# Patient Record
(demographics unavailable — no encounter records)

---

## 2024-11-12 NOTE — PHYSICAL EXAM
[Chaperone Present] : A chaperone was present in the examining room during all aspects of the physical examination [Normal] : Vagina: Normal [Fully active, able to carry on all pre-disease performance without restriction] : Status 0 - Fully active, able to carry on all pre-disease performance without restriction

## 2024-11-13 NOTE — REVIEW OF SYSTEMS
[Diarrhea: Grade 0] : Diarrhea: Grade 0 [FreeTextEntry2] : + elevated BP and headaches associated with it.  [Negative] : ENT [FreeTextEntry8] : occasional vag spotting

## 2024-11-13 NOTE — HISTORY OF PRESENT ILLNESS
[Disease: _____________________] : Disease: [unfilled] [AJCC Stage: ____] : AJCC Stage: [unfilled] [de-identified] : 68 y.o female with newly diagnosed fallopian tube cancer s/p surgery on 11/28/2022 Patient initially presented with pain in the vaginal area She saw gyn, Dr. Denson  and an USG was obtained which showed adnexal mass  She was referred to Dr. White. Ms. Vernon is a 68 years old  referred by Dr. Saldana for bilateral adnexal masses.  CT A/P 11/1/22: Bilateral adnexal masses, solid and irregular appearance, 9.5 x 8.2 cm on left and 6.7 x 5.4 on right. No ascites or LAD identified. 1.7 cm omental nodule. Multiple small liver cysts. Splenomegaly.  10/20/22 TVUS (Office US) Uterus: 9.59 x 7.06 x 4.41 cm (mult fibroids, largest posterior measures 4 x 3 cm) EM: 6.35 mm Left Adnexal mass: 7.5 x 6.8 x 6.2 cm  Right adnexal mass: 5.4 x 4.4 x 3.2 cm with increased vascularity Free fluid visualized in the right adnexa and culdesac  Labs (in [facility])  = 29 OVA1 = 6.6 CEA < 0.6 CA 19-9 < 2  She saw Dr. 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 diff colitis , completed a course of Vanco.( last dose was on 12/22/22)  PMH: ET, HTN,  PSH:CESAR, BSO, Csection SH:, three children. No smoking, no drinking. Retired ASA FH:Dad with brain cancer, 70s, Aunt and cousins. ALL: PCN, hives, Latex Medications: ASA, Agrylin, Amlodipine  She completed six cycles of Carboplatin and Taxol. Last dose 4/2023. She declined Avastin maintenance. Not a candidate for PARPi as no somatic or germline mutation. HRp and due to underlying marrow diseae( ET) hgh risk for myelosuppression and secondary AML/ MDS. Routine blood work in Feb, 2024 showed elevated . Scans were normal. On 3/21/24 PET done showed- Multiple FDG avid tissues in the mesentery, retroperitoneal and iliac fernando stations concerning for malignant process. However, in a patient with myelofibrosis, extramedullary hematopoiesis can take place. Left common iliac node with the most intense activity is accessible for percutaneous biopsy. FDG avid left supraclavicular nodes with calcific densities stable since 8/2023 may represent reactive process.  2. Diffuse sclerotic changes throughout the osseous structures compatible with history of myelofibrosis. Biopsy of the supraclavicular LN consistent with relapsed serous ovarian cancer.  She completed 6 cycles of Carboplatin/Doxil/Zirabev [de-identified] : Carboplatin and Taxol (1/12/23- 4/27/23) six cycles. No pathogenic mutation on genetic test. HRDp Carboplatin/Doxil/Zirabev (six cycle) [FreeTextEntry1] : Zirabev maintenance C1 - 11/13/24 [de-identified] : Patient is here for follow up and to start maintenance Zirabev. She is feeling well, has recovered from chemo. She saw Dr. White yesterday for intermittent vag spotting, exam was good. Advised lubricant for vag dryness. BP has been well controlled, forgot to take her HTN meds today. Appetite is good, she is gaining a few pounds. Recent breast biopsy was benign. Denies chest pain, SOB, abdominal pain, nausea, vomiting, bowel/bladder issues, swelling.

## 2024-11-13 NOTE — REASON FOR VISIT
[Follow-Up Visit] : a follow-up [Pre-Treatment Visit] : a pre-treatment [FreeTextEntry2] : relapsed FTCA

## 2024-11-13 NOTE — PHYSICAL EXAM
[Fully active, able to carry on all pre-disease performance without restriction] : Status 0 - Fully active, able to carry on all pre-disease performance without restriction [Mucositis] : mucositis [Normal] : affect appropriate

## 2024-12-08 NOTE — HISTORY OF PRESENT ILLNESS
[Disease: _____________________] : Disease: [unfilled] [AJCC Stage: ____] : AJCC Stage: [unfilled] [de-identified] : 68 y.o female with newly diagnosed fallopian tube cancer s/p surgery on 11/28/2022 Patient initially presented with pain in the vaginal area She saw gyn, Dr. Denson  and an USG was obtained which showed adnexal mass  She was referred to Dr. White. Ms. Vernon is a 68 years old  referred by Dr. Saldana for bilateral adnexal masses.  CT A/P 11/1/22: Bilateral adnexal masses, solid and irregular appearance, 9.5 x 8.2 cm on left and 6.7 x 5.4 on right. No ascites or LAD identified. 1.7 cm omental nodule. Multiple small liver cysts. Splenomegaly.  10/20/22 TVUS (Office US) Uterus: 9.59 x 7.06 x 4.41 cm (mult fibroids, largest posterior measures 4 x 3 cm) EM: 6.35 mm Left Adnexal mass: 7.5 x 6.8 x 6.2 cm  Right adnexal mass: 5.4 x 4.4 x 3.2 cm with increased vascularity Free fluid visualized in the right adnexa and culdesac  Labs (in [facility])  = 29 OVA1 = 6.6 CEA < 0.6 CA 19-9 < 2  She saw Dr. 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 Stage: III C, FTCA      Patient had C. diff colitis , completed a course of Vanco.( last dose was on 12/22/22)  PMH: ET, HTN,  PSH:CESAR, BSO, Csection SH:, three children. No smoking, no drinking. Retired ASA FH:Dad with brain cancer, 70s, Aunt and cousins. ALL: PCN, hives, Latex Medications: ASA, Agrylin, Amlodipine  She completed six cycles of Carboplatin and Taxol. Last dose 4/2023. She declined Avastin maintenance. Not a candidate for PARPi as no somatic or germline mutation. HRp and due to underlying marrow diseae( ET) hgh risk for myelosuppression and secondary AML/ MDS. Routine blood work in Feb, 2024 showed elevated . Scans were normal. On 3/21/24 PET done showed- Multiple FDG avid tissues in the mesentery, retroperitoneal and iliac fernando stations concerning for malignant process. However, in a patient with myelofibrosis, extramedullary hematopoiesis can take place. Left common iliac node with the most intense activity is accessible for percutaneous biopsy. FDG avid left supraclavicular nodes with calcific densities stable since 8/2023 may represent reactive process.  2. Diffuse sclerotic changes throughout the osseous structures compatible with history of myelofibrosis.  Biopsy of the supraclavicular LN consistent with relapsed serous ovarian cancer. [de-identified] : Carboplatin and Taxol (1/12/23- 4/27/23) six cycles.  No pathogenic mutation on genetic test. HRDp [de-identified] : She has some joint pain, on Tylenol with good relief. She also has a . She has mild constipation. She noticed ankle swelling for a few days and then resolved. She saw Dr. White for vaginal bleeding. No findings on exam.

## 2024-12-08 NOTE — HISTORY OF PRESENT ILLNESS
[Disease: _____________________] : Disease: [unfilled] [AJCC Stage: ____] : AJCC Stage: [unfilled] [de-identified] : 68 y.o female with newly diagnosed fallopian tube cancer s/p surgery on 11/28/2022 Patient initially presented with pain in the vaginal area She saw gyn, Dr. Denson  and an USG was obtained which showed adnexal mass  She was referred to Dr. White. Ms. Vernon is a 68 years old  referred by Dr. Saldana for bilateral adnexal masses.  CT A/P 11/1/22: Bilateral adnexal masses, solid and irregular appearance, 9.5 x 8.2 cm on left and 6.7 x 5.4 on right. No ascites or LAD identified. 1.7 cm omental nodule. Multiple small liver cysts. Splenomegaly.  10/20/22 TVUS (Office US) Uterus: 9.59 x 7.06 x 4.41 cm (mult fibroids, largest posterior measures 4 x 3 cm) EM: 6.35 mm Left Adnexal mass: 7.5 x 6.8 x 6.2 cm  Right adnexal mass: 5.4 x 4.4 x 3.2 cm with increased vascularity Free fluid visualized in the right adnexa and culdesac  Labs (in [facility])  = 29 OVA1 = 6.6 CEA < 0.6 CA 19-9 < 2  She saw Dr. 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 Stage: III C, FTCA      Patient had C. diff colitis , completed a course of Vanco.( last dose was on 12/22/22)  PMH: ET, HTN,  PSH:CESAR, BSO, Csection SH:, three children. No smoking, no drinking. Retired ASA FH:Dad with brain cancer, 70s, Aunt and cousins. ALL: PCN, hives, Latex Medications: ASA, Agrylin, Amlodipine  She completed six cycles of Carboplatin and Taxol. Last dose 4/2023. She declined Avastin maintenance. Not a candidate for PARPi as no somatic or germline mutation. HRp and due to underlying marrow diseae( ET) hgh risk for myelosuppression and secondary AML/ MDS. Routine blood work in Feb, 2024 showed elevated . Scans were normal. On 3/21/24 PET done showed- Multiple FDG avid tissues in the mesentery, retroperitoneal and iliac fernando stations concerning for malignant process. However, in a patient with myelofibrosis, extramedullary hematopoiesis can take place. Left common iliac node with the most intense activity is accessible for percutaneous biopsy. FDG avid left supraclavicular nodes with calcific densities stable since 8/2023 may represent reactive process.  2. Diffuse sclerotic changes throughout the osseous structures compatible with history of myelofibrosis.  Biopsy of the supraclavicular LN consistent with relapsed serous ovarian cancer. [de-identified] : Carboplatin and Taxol (1/12/23- 4/27/23) six cycles.  No pathogenic mutation on genetic test. HRDp [de-identified] : She has some joint pain, on Tylenol with good relief. She also has a . She has mild constipation. She noticed ankle swelling for a few days and then resolved. She saw Dr. White for vaginal bleeding. No findings on exam.

## 2025-01-03 NOTE — HISTORY OF PRESENT ILLNESS
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  FOLR1 Positive (85%) TB recommendation: adjuvant C/T   GYN/Ref: Rufina Saldana MD PCP/cardio:  GI:  Med Onc: Dr. Viri Noguera   Adjuvant Carbo/Taxol completed 4/27/23 x 6 cycles  CA-125 was 47 on 4/18/24, 26 on 2/14/24, 6 on 11/6/23, previously 8 on 8/8/23, previously 5 on 5/9/23  Recurred 3/21/24: Multiple FDG avid tissues in the mesentery, retroperitoneal and iliac fernando stations concerning for malignant process.  Left common iliac node with the most intense activity is accessible for percutaneous biopsy. FDG avid left supraclavicular nodes with calcific densities stable since 8/2023 may represent reactive process.  IR guided biopsy of left supraclavicular node on 4/4/24: c/w recurrent high grade serous carcinoma  Started Carbo/Doxil/Sapphire Has completed 6 cycles.  Now on maintenance Sapphire.   remains wnl.  PETCT 10/2024 with partial response/stable disease.  Overall feeling well. No vaginal bleeding since last visit in office.

## 2025-01-03 NOTE — PHYSICAL EXAM
[Chaperone Present] : A chaperone was present in the examining room during all aspects of the physical examination [Fully active, able to carry on all pre-disease performance without restriction] : Status 0 - Fully active, able to carry on all pre-disease performance without restriction [09625] : A chaperone was present during the pelvic exam. [FreeTextEntry2] : Janet [Absent] : Adnexa(ae): Absent [Normal] : Recto-Vaginal Exam: Normal [de-identified] : Healed incision

## 2025-01-03 NOTE — HISTORY OF PRESENT ILLNESS
[Disease: _____________________] : Disease: [unfilled] [AJCC Stage: ____] : AJCC Stage: [unfilled] [de-identified] : 68 y.o female with newly diagnosed fallopian tube cancer s/p surgery on 11/28/2022 Patient initially presented with pain in the vaginal area She saw gyn, Dr. Denson  and an USG was obtained which showed adnexal mass  She was referred to Dr. White. Ms. Vernon is a 68 years old  referred by Dr. Saldana for bilateral adnexal masses.  CT A/P 11/1/22: Bilateral adnexal masses, solid and irregular appearance, 9.5 x 8.2 cm on left and 6.7 x 5.4 on right. No ascites or LAD identified. 1.7 cm omental nodule. Multiple small liver cysts. Splenomegaly.  10/20/22 TVUS (Office US) Uterus: 9.59 x 7.06 x 4.41 cm (mult fibroids, largest posterior measures 4 x 3 cm) EM: 6.35 mm Left Adnexal mass: 7.5 x 6.8 x 6.2 cm  Right adnexal mass: 5.4 x 4.4 x 3.2 cm with increased vascularity Free fluid visualized in the right adnexa and culdesac  Labs (in [facility])  = 29 OVA1 = 6.6 CEA < 0.6 CA 19-9 < 2  She saw Dr. 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 Stage: III C, FTCA      Patient had C. diff colitis , completed a course of Vanco.( last dose was on 12/22/22)  PMH: ET, HTN,  PSH:CESAR, BSO, Csection SH:, three children. No smoking, no drinking. Retired ASA FH:Dad with brain cancer, 70s, Aunt and cousins. ALL: PCN, hives, Latex Medications: ASA, Agrylin, Amlodipine  She completed six cycles of Carboplatin and Taxol. Last dose 4/2023. She declined Avastin maintenance. Not a candidate for PARPi as no somatic or germline mutation. HRp and due to underlying marrow diseae( ET) hgh risk for myelosuppression and secondary AML/ MDS. Routine blood work in Feb, 2024 showed elevated . Scans were normal. On 3/21/24 PET done showed- Multiple FDG avid tissues in the mesentery, retroperitoneal and iliac fernando stations concerning for malignant process. However, in a patient with myelofibrosis, extramedullary hematopoiesis can take place. Left common iliac node with the most intense activity is accessible for percutaneous biopsy. FDG avid left supraclavicular nodes with calcific densities stable since 8/2023 may represent reactive process.  2. Diffuse sclerotic changes throughout the osseous structures compatible with history of myelofibrosis.  Biopsy of the supraclavicular LN consistent with relapsed serous ovarian cancer. [de-identified] : Carboplatin and Taxol (1/12/23- 4/27/23) six cycles.  No pathogenic mutation on genetic test. HRDp [de-identified] : Ileana is here for a follow up. She feels well. Has not set up an appt for PET yet.

## 2025-01-03 NOTE — PLAN
[TextEntry] : Tolerating maintenance Sapphire treatment well.  Due for scans - will schedule PETCT.  All questions answered.  Return in 3 months.

## 2025-01-24 NOTE — HISTORY OF PRESENT ILLNESS
[Disease: _____________________] : Disease: [unfilled] [AJCC Stage: ____] : AJCC Stage: [unfilled] [de-identified] : 68 y.o female with newly diagnosed fallopian tube cancer s/p surgery on 11/28/2022 Patient initially presented with pain in the vaginal area She saw gyn, Dr. Denson  and an USG was obtained which showed adnexal mass  She was referred to Dr. White. Ms. Vernon is a 68 years old  referred by Dr. Saldana for bilateral adnexal masses.  CT A/P 11/1/22: Bilateral adnexal masses, solid and irregular appearance, 9.5 x 8.2 cm on left and 6.7 x 5.4 on right. No ascites or LAD identified. 1.7 cm omental nodule. Multiple small liver cysts. Splenomegaly.  10/20/22 TVUS (Office US) Uterus: 9.59 x 7.06 x 4.41 cm (mult fibroids, largest posterior measures 4 x 3 cm) EM: 6.35 mm Left Adnexal mass: 7.5 x 6.8 x 6.2 cm  Right adnexal mass: 5.4 x 4.4 x 3.2 cm with increased vascularity Free fluid visualized in the right adnexa and culdesac  Labs (in [facility])  = 29 OVA1 = 6.6 CEA < 0.6 CA 19-9 < 2  She saw Dr. 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 Stage: III C, FTCA      Patient had C. diff colitis , completed a course of Vanco.( last dose was on 12/22/22)  PMH: ET, HTN,  PSH:CESAR, BSO, Csection SH:, three children. No smoking, no drinking. Retired ASA FH:Dad with brain cancer, 70s, Aunt and cousins. ALL: PCN, hives, Latex Medications: ASA, Agrylin, Amlodipine  She completed six cycles of Carboplatin and Taxol. Last dose 4/2023. She declined Avastin maintenance. Not a candidate for PARPi as no somatic or germline mutation. HRp and due to underlying marrow diseae( ET) hgh risk for myelosuppression and secondary AML/ MDS. Routine blood work in Feb, 2024 showed elevated . Scans were normal. On 3/21/24 PET done showed- Multiple FDG avid tissues in the mesentery, retroperitoneal and iliac fernando stations concerning for malignant process. However, in a patient with myelofibrosis, extramedullary hematopoiesis can take place. Left common iliac node with the most intense activity is accessible for percutaneous biopsy. FDG avid left supraclavicular nodes with calcific densities stable since 8/2023 may represent reactive process.  2. Diffuse sclerotic changes throughout the osseous structures compatible with history of myelofibrosis.  Biopsy of the supraclavicular LN consistent with relapsed serous ovarian cancer. [de-identified] : Carboplatin and Taxol (1/12/23- 4/27/23) six cycles.  No pathogenic mutation on genetic test. HRDp [de-identified] : Patient is here for follow up and treatment. She is overall feeling well. She still has some foot pain and may need procedure but doctor is trying orthotics first. She has some intermittent bleeding gums. She continues to have some decreased appetite, would like to gain weight. She denies abdominal pain, bloating, nausea, vomiting, bowel/bladder issues, swelling.

## 2025-01-24 NOTE — REVIEW OF SYSTEMS
[Diarrhea: Grade 0] : Diarrhea: Grade 0 [Negative] : Allergic/Immunologic [FreeTextEntry4] : mild intermittent bleeding gums [FreeTextEntry9] : foot pain

## 2025-02-17 NOTE — HISTORY OF PRESENT ILLNESS
[Disease: _____________________] : Disease: [unfilled] [AJCC Stage: ____] : AJCC Stage: [unfilled] [de-identified] : 68 y.o female with newly diagnosed fallopian tube cancer s/p surgery on 11/28/2022 Patient initially presented with pain in the vaginal area She saw gyn, Dr. Denson  and an USG was obtained which showed adnexal mass  She was referred to Dr. White. Ms. Vernon is a 68 years old  referred by Dr. Saldana for bilateral adnexal masses.  CT A/P 11/1/22: Bilateral adnexal masses, solid and irregular appearance, 9.5 x 8.2 cm on left and 6.7 x 5.4 on right. No ascites or LAD identified. 1.7 cm omental nodule. Multiple small liver cysts. Splenomegaly.  10/20/22 TVUS (Office US) Uterus: 9.59 x 7.06 x 4.41 cm (mult fibroids, largest posterior measures 4 x 3 cm) EM: 6.35 mm Left Adnexal mass: 7.5 x 6.8 x 6.2 cm  Right adnexal mass: 5.4 x 4.4 x 3.2 cm with increased vascularity Free fluid visualized in the right adnexa and culdesac  Labs (in [facility])  = 29 OVA1 = 6.6 CEA < 0.6 CA 19-9 < 2  She saw Dr. 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 Stage: III C, FTCA      Patient had C. diff colitis , completed a course of Vanco.( last dose was on 12/22/22)  PMH: ET, HTN,  PSH:CESAR, BSO, Csection SH:, three children. No smoking, no drinking. Retired ASA FH:Dad with brain cancer, 70s, Aunt and cousins. ALL: PCN, hives, Latex Medications: ASA, Agrylin, Amlodipine  She completed six cycles of Carboplatin and Taxol. Last dose 4/2023. She declined Avastin maintenance. Not a candidate for PARPi as no somatic or germline mutation. HRp and due to underlying marrow diseae( ET) hgh risk for myelosuppression and secondary AML/ MDS. Routine blood work in Feb, 2024 showed elevated . Scans were normal. On 3/21/24 PET done showed- Multiple FDG avid tissues in the mesentery, retroperitoneal and iliac fernando stations concerning for malignant process. However, in a patient with myelofibrosis, extramedullary hematopoiesis can take place. Left common iliac node with the most intense activity is accessible for percutaneous biopsy. FDG avid left supraclavicular nodes with calcific densities stable since 8/2023 may represent reactive process.  2. Diffuse sclerotic changes throughout the osseous structures compatible with history of myelofibrosis.  Biopsy of the supraclavicular LN consistent with relapsed serous ovarian cancer. [de-identified] : Carboplatin and Taxol (1/12/23- 4/27/23) six cycles.  No pathogenic mutation on genetic test. HRDp [FreeTextEntry1] : Sapphire [de-identified] : Ileana is here for a follow up visit. She feels well. Recent scans showed stable disease.

## 2025-03-07 NOTE — HISTORY OF PRESENT ILLNESS
[Disease: _____________________] : Disease: [unfilled] [AJCC Stage: ____] : AJCC Stage: [unfilled] [de-identified] : 68 y.o female with newly diagnosed fallopian tube cancer s/p surgery on 11/28/2022 Patient initially presented with pain in the vaginal area She saw gyn, Dr. Denson  and an USG was obtained which showed adnexal mass  She was referred to Dr. White. Ms. Vernon is a 68 years old  referred by Dr. Saldana for bilateral adnexal masses.  CT A/P 11/1/22: Bilateral adnexal masses, solid and irregular appearance, 9.5 x 8.2 cm on left and 6.7 x 5.4 on right. No ascites or LAD identified. 1.7 cm omental nodule. Multiple small liver cysts. Splenomegaly.  10/20/22 TVUS (Office US) Uterus: 9.59 x 7.06 x 4.41 cm (mult fibroids, largest posterior measures 4 x 3 cm) EM: 6.35 mm Left Adnexal mass: 7.5 x 6.8 x 6.2 cm  Right adnexal mass: 5.4 x 4.4 x 3.2 cm with increased vascularity Free fluid visualized in the right adnexa and culdesac  Labs (in [facility])  = 29 OVA1 = 6.6 CEA < 0.6 CA 19-9 < 2  She saw Dr. 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 Stage: III C, FTCA      Patient had C. diff colitis , completed a course of Vanco.( last dose was on 12/22/22)  PMH: ET, HTN,  PSH:CESAR, BSO, Csection SH:, three children. No smoking, no drinking. Retired ASA FH:Dad with brain cancer, 70s, Aunt and cousins. ALL: PCN, hives, Latex Medications: ASA, Agrylin, Amlodipine  She completed six cycles of Carboplatin and Taxol. Last dose 4/2023. She declined Avastin maintenance. Not a candidate for PARPi as no somatic or germline mutation. HRp and due to underlying marrow diseae( ET) hgh risk for myelosuppression and secondary AML/ MDS. Routine blood work in Feb, 2024 showed elevated . Scans were normal. On 3/21/24 PET done showed- Multiple FDG avid tissues in the mesentery, retroperitoneal and iliac fernando stations concerning for malignant process. However, in a patient with myelofibrosis, extramedullary hematopoiesis can take place. Left common iliac node with the most intense activity is accessible for percutaneous biopsy. FDG avid left supraclavicular nodes with calcific densities stable since 8/2023 may represent reactive process.  2. Diffuse sclerotic changes throughout the osseous structures compatible with history of myelofibrosis.  Biopsy of the supraclavicular LN consistent with relapsed serous ovarian cancer. [de-identified] : Carboplatin and Taxol (1/12/23- 4/27/23) six cycles.  No pathogenic mutation on genetic test. HRDp [FreeTextEntry1] : Zirabev C6 - 3/7/25 [de-identified] : Saw Dr. Cortes for elevated plt, increased Agrylin. She is overall feeling well, no new complaints. She recently retired. Appetite is much better, eating well. She denies chest pain, SOB, abdominal pain, nausea, vomiting, diarrhea, constipation, bleeding, swelling.

## 2025-03-07 NOTE — HISTORY OF PRESENT ILLNESS
[Disease: _____________________] : Disease: [unfilled] [AJCC Stage: ____] : AJCC Stage: [unfilled] [de-identified] : 68 y.o female with newly diagnosed fallopian tube cancer s/p surgery on 11/28/2022 Patient initially presented with pain in the vaginal area She saw gyn, Dr. Denson  and an USG was obtained which showed adnexal mass  She was referred to Dr. White. Ms. Vernon is a 68 years old  referred by Dr. Saldana for bilateral adnexal masses.  CT A/P 11/1/22: Bilateral adnexal masses, solid and irregular appearance, 9.5 x 8.2 cm on left and 6.7 x 5.4 on right. No ascites or LAD identified. 1.7 cm omental nodule. Multiple small liver cysts. Splenomegaly.  10/20/22 TVUS (Office US) Uterus: 9.59 x 7.06 x 4.41 cm (mult fibroids, largest posterior measures 4 x 3 cm) EM: 6.35 mm Left Adnexal mass: 7.5 x 6.8 x 6.2 cm  Right adnexal mass: 5.4 x 4.4 x 3.2 cm with increased vascularity Free fluid visualized in the right adnexa and culdesac  Labs (in [facility])  = 29 OVA1 = 6.6 CEA < 0.6 CA 19-9 < 2  She saw Dr. 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 Stage: III C, FTCA      Patient had C. diff colitis , completed a course of Vanco.( last dose was on 12/22/22)  PMH: ET, HTN,  PSH:CESAR, BSO, Csection SH:, three children. No smoking, no drinking. Retired ASA FH:Dad with brain cancer, 70s, Aunt and cousins. ALL: PCN, hives, Latex Medications: ASA, Agrylin, Amlodipine  She completed six cycles of Carboplatin and Taxol. Last dose 4/2023. She declined Avastin maintenance. Not a candidate for PARPi as no somatic or germline mutation. HRp and due to underlying marrow diseae( ET) hgh risk for myelosuppression and secondary AML/ MDS. Routine blood work in Feb, 2024 showed elevated . Scans were normal. On 3/21/24 PET done showed- Multiple FDG avid tissues in the mesentery, retroperitoneal and iliac fernando stations concerning for malignant process. However, in a patient with myelofibrosis, extramedullary hematopoiesis can take place. Left common iliac node with the most intense activity is accessible for percutaneous biopsy. FDG avid left supraclavicular nodes with calcific densities stable since 8/2023 may represent reactive process.  2. Diffuse sclerotic changes throughout the osseous structures compatible with history of myelofibrosis.  Biopsy of the supraclavicular LN consistent with relapsed serous ovarian cancer. [de-identified] : Carboplatin and Taxol (1/12/23- 4/27/23) six cycles.  No pathogenic mutation on genetic test. HRDp [FreeTextEntry1] : Zirabev C6 - 3/7/25 [de-identified] : Saw Dr. Cortes for elevated plt, increased Agrylin. She is overall feeling well, no new complaints. She recently retired. Appetite is much better, eating well. She denies chest pain, SOB, abdominal pain, nausea, vomiting, diarrhea, constipation, bleeding, swelling.

## 2025-03-21 NOTE — REASON FOR VISIT
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 FOLR1 Positive (85%) TB recommendation: adjuvant C/T  GYN/Ref: Rufina Saldana MD PCP/cardio:  GI:  Med Onc: Dr. Viri Noguera  Adjuvant Carbo/Taxol completed 4/27/23 x 6 cycles  CA-125 was 47 on 4/18/24, 26 on 2/14/24, 6 on 11/6/23, previously 8 on 8/8/23, previously 5 on 5/9/23  Recurred 3/21/24: Multiple FDG avid tissues in the mesentery, retroperitoneal and iliac fernando stations concerning for malignant process. Left common iliac node with the most intense activity is accessible for percutaneous biopsy. FDG avid left supraclavicular nodes with calcific densities stable since 8/2023 may represent reactive process.  IR guided biopsy of left supraclavicular node on 4/4/24: c/w recurrent high grade serous carcinoma  Started Carbo/Doxil/Sapphire Has completed 6 cycles. Now on maintenance Sapphire.  remains wnl. PETCT 10/2024 with partial response/stable disease. PET/CT 2/5/25: stable disease  She is overall feeling well.

## 2025-03-28 NOTE — HISTORY OF PRESENT ILLNESS
[Disease: _____________________] : Disease: [unfilled] [AJCC Stage: ____] : AJCC Stage: [unfilled] [de-identified] : 68 y.o female with newly diagnosed fallopian tube cancer s/p surgery on 11/28/2022 Patient initially presented with pain in the vaginal area She saw gyn, Dr. Denson  and an USG was obtained which showed adnexal mass  She was referred to Dr. White. Ms. Vernon is a 68 years old  referred by Dr. Saldana for bilateral adnexal masses.  CT A/P 11/1/22: Bilateral adnexal masses, solid and irregular appearance, 9.5 x 8.2 cm on left and 6.7 x 5.4 on right. No ascites or LAD identified. 1.7 cm omental nodule. Multiple small liver cysts. Splenomegaly.  10/20/22 TVUS (Office US) Uterus: 9.59 x 7.06 x 4.41 cm (mult fibroids, largest posterior measures 4 x 3 cm) EM: 6.35 mm Left Adnexal mass: 7.5 x 6.8 x 6.2 cm  Right adnexal mass: 5.4 x 4.4 x 3.2 cm with increased vascularity Free fluid visualized in the right adnexa and culdesac  Labs (in [facility])  = 29 OVA1 = 6.6 CEA < 0.6 CA 19-9 < 2  She saw Dr. 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 Stage: III C, FTCA      Patient had C. diff colitis , completed a course of Vanco.( last dose was on 12/22/22)  PMH: ET, HTN,  PSH:CESAR, BSO, Csection SH:, three children. No smoking, no drinking. Retired ASA FH:Dad with brain cancer, 70s, Aunt and cousins. ALL: PCN, hives, Latex Medications: ASA, Agrylin, Amlodipine  She completed six cycles of Carboplatin and Taxol. Last dose 4/2023. She declined Avastin maintenance. Not a candidate for PARPi as no somatic or germline mutation. HRp and due to underlying marrow diseae( ET) hgh risk for myelosuppression and secondary AML/ MDS. Routine blood work in Feb, 2024 showed elevated . Scans were normal. On 3/21/24 PET done showed- Multiple FDG avid tissues in the mesentery, retroperitoneal and iliac fernando stations concerning for malignant process. However, in a patient with myelofibrosis, extramedullary hematopoiesis can take place. Left common iliac node with the most intense activity is accessible for percutaneous biopsy. FDG avid left supraclavicular nodes with calcific densities stable since 8/2023 may represent reactive process.  2. Diffuse sclerotic changes throughout the osseous structures compatible with history of myelofibrosis.  Biopsy of the supraclavicular LN consistent with relapsed serous ovarian cancer. [de-identified] : Carboplatin and Taxol (1/12/23- 4/27/23) six cycles.  No pathogenic mutation on genetic test. HRDp [FreeTextEntry1] : Sapphire C7- 3/28/25 [de-identified] : Erickson here for a follow up visit. She is tolerating treatment well.

## 2025-05-14 NOTE — HISTORY OF PRESENT ILLNESS
[de-identified] : 68 y.o female with newly diagnosed fallopian tube cancer s/p surgery on 11/28/2022 Patient initially presented with pain in the vaginal area She saw gyn, Dr. Denson  and an USG was obtained which showed adnexal mass  She was referred to Dr. White. Ms. Vernon is a 68 years old  referred by Dr. Saldana for bilateral adnexal masses.  CT A/P 11/1/22: Bilateral adnexal masses, solid and irregular appearance, 9.5 x 8.2 cm on left and 6.7 x 5.4 on right. No ascites or LAD identified. 1.7 cm omental nodule. Multiple small liver cysts. Splenomegaly.  10/20/22 TVUS (Office US) Uterus: 9.59 x 7.06 x 4.41 cm (mult fibroids, largest posterior measures 4 x 3 cm) EM: 6.35 mm Left Adnexal mass: 7.5 x 6.8 x 6.2 cm  Right adnexal mass: 5.4 x 4.4 x 3.2 cm with increased vascularity Free fluid visualized in the right adnexa and culdesac  Labs (in [facility])  = 29 OVA1 = 6.6 CEA < 0.6 CA 19-9 < 2  She saw Dr. 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 Stage: III C, FTCA      Patient had C. diff colitis , completed a course of Vanco.( last dose was on 12/22/22)  PMH: ET, HTN,  PSH:CESAR, BSO, Csection SH:, three children. No smoking, no drinking. Retired ASA FH:Dad with brain cancer, 70s, Aunt and cousins. ALL: PCN, hives, Latex Medications: ASA, Agrylin, Amlodipine  She completed six cycles of Carboplatin and Taxol. Last dose 4/2023. She declined Avastin maintenance. Not a candidate for PARPi as no somatic or germline mutation. HRp and due to underlying marrow diseae( ET) hgh risk for myelosuppression and secondary AML/ MDS. Routine blood work in Feb, 2024 showed elevated . Scans were normal. On 3/21/24 PET done showed- Multiple FDG avid tissues in the mesentery, retroperitoneal and iliac fernando stations concerning for malignant process. However, in a patient with myelofibrosis, extramedullary hematopoiesis can take place. Left common iliac node with the most intense activity is accessible for percutaneous biopsy. FDG avid left supraclavicular nodes with calcific densities stable since 8/2023 may represent reactive process.  2. Diffuse sclerotic changes throughout the osseous structures compatible with history of myelofibrosis.  Biopsy of the supraclavicular LN consistent with relapsed serous ovarian cancer. [de-identified] : Carboplatin and Taxol (1/12/23- 4/27/23) six cycles.  No pathogenic mutation on genetic test. HRDp [FreeTextEntry1] : Sapphire C9 - 5/14/25 [de-identified] : Patient is here for follow up and treatment.  She is overall doing well. 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 Appetite is good. Denies abdominal pain, bloating, nausea, vomiting, bowel/bladder issues, bleeding, swelling. Dr. Cortes is monitoring her plt.

## 2025-06-06 NOTE — PHYSICAL EXAM
[FreeTextEntry2] : Janet [Absent] : Adnexa(ae): Absent [Normal] : Recto-Vaginal Exam: Normal [de-identified] : Healed incision [Fully active, able to carry on all pre-disease performance without restriction] : Status 0 - Fully active, able to carry on all pre-disease performance without restriction

## 2025-06-06 NOTE — PLAN
[TextEntry] : Tolerating maintenance Sapphire treatment well.  Continue to monitor  - if continues to rise will evaluate with PETCT.  All questions answered.  Return in 3 months.

## 2025-06-06 NOTE — HISTORY OF PRESENT ILLNESS
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  FOLR1 Positive (85%) TB recommendation: adjuvant C/T   GYN/Ref: Rufina Saldana MD PCP/cardio:  GI:  Med Onc: Dr. Viri Noguera   Adjuvant Carbo/Taxol completed 4/27/23 x 6 cycles  CA-125 was 47 on 4/18/24, 26 on 2/14/24, 6 on 11/6/23, previously 8 on 8/8/23, previously 5 on 5/9/23  Recurred 3/21/24: Multiple FDG avid tissues in the mesentery, retroperitoneal and iliac fernando stations concerning for malignant process.  Left common iliac node with the most intense activity is accessible for percutaneous biopsy. FDG avid left supraclavicular nodes with calcific densities stable since 8/2023 may represent reactive process.  IR guided biopsy of left supraclavicular node on 4/4/24: c/w recurrent high grade serous carcinoma  Started Carbo/Doxil/Sapphire Has completed 6 cycles.  Now on maintenance Sapphire.   remains wnl but has been slowly rising.  5/22/25 CT C/A/P ARIELLA   Overall feeling well. No vaginal bleeding since last visit in office.  Has some GI discomfort after a weekend of eating out with multiple meals that were rich/spicy.  Painful hemorrhoids.

## 2025-06-08 NOTE — HISTORY OF PRESENT ILLNESS
[Disease: _____________________] : Disease: [unfilled] [AJCC Stage: ____] : AJCC Stage: [unfilled] [de-identified] : 68 y.o female with newly diagnosed fallopian tube cancer s/p surgery on 11/28/2022 Patient initially presented with pain in the vaginal area She saw gyn, Dr. Denson  and an USG was obtained which showed adnexal mass  She was referred to Dr. White. Ms. Vernon is a 68 years old  referred by Dr. Saldana for bilateral adnexal masses.  CT A/P 11/1/22: Bilateral adnexal masses, solid and irregular appearance, 9.5 x 8.2 cm on left and 6.7 x 5.4 on right. No ascites or LAD identified. 1.7 cm omental nodule. Multiple small liver cysts. Splenomegaly.  10/20/22 TVUS (Office US) Uterus: 9.59 x 7.06 x 4.41 cm (mult fibroids, largest posterior measures 4 x 3 cm) EM: 6.35 mm Left Adnexal mass: 7.5 x 6.8 x 6.2 cm  Right adnexal mass: 5.4 x 4.4 x 3.2 cm with increased vascularity Free fluid visualized in the right adnexa and culdesac  Labs (in [facility])  = 29 OVA1 = 6.6 CEA < 0.6 CA 19-9 < 2  She saw Dr. 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 Stage: III C, FTCA      Patient had C. diff colitis , completed a course of Vanco.( last dose was on 12/22/22)  PMH: ET, HTN,  PSH:CESAR, BSO, Csection SH:, three children. No smoking, no drinking. Retired ASA FH:Dad with brain cancer, 70s, Aunt and cousins. ALL: PCN, hives, Latex Medications: ASA, Agrylin, Amlodipine  She completed six cycles of Carboplatin and Taxol. Last dose 4/2023. She declined Avastin maintenance. Not a candidate for PARPi as no somatic or germline mutation. HRp and due to underlying marrow diseae( ET) hgh risk for myelosuppression and secondary AML/ MDS. Routine blood work in Feb, 2024 showed elevated . Scans were normal. On 3/21/24 PET done showed- Multiple FDG avid tissues in the mesentery, retroperitoneal and iliac fernando stations concerning for malignant process. However, in a patient with myelofibrosis, extramedullary hematopoiesis can take place. Left common iliac node with the most intense activity is accessible for percutaneous biopsy. FDG avid left supraclavicular nodes with calcific densities stable since 8/2023 may represent reactive process.  2. Diffuse sclerotic changes throughout the osseous structures compatible with history of myelofibrosis.  Biopsy of the supraclavicular LN consistent with relapsed serous ovarian cancer. [de-identified] : Carboplatin and Taxol (1/12/23- 4/27/23) six cycles.  No pathogenic mutation on genetic test. HRDp [de-identified] : She had some diarrhea. She took Pepto-Bismol and now diarrhea resolved.  Denies any pain. Appetite is good.

## 2025-06-08 NOTE — HISTORY OF PRESENT ILLNESS
[Disease: _____________________] : Disease: [unfilled] [AJCC Stage: ____] : AJCC Stage: [unfilled] [de-identified] : 68 y.o female with newly diagnosed fallopian tube cancer s/p surgery on 11/28/2022 Patient initially presented with pain in the vaginal area She saw gyn, Dr. Denson  and an USG was obtained which showed adnexal mass  She was referred to Dr. White. Ms. Vernon is a 68 years old  referred by Dr. Saldana for bilateral adnexal masses.  CT A/P 11/1/22: Bilateral adnexal masses, solid and irregular appearance, 9.5 x 8.2 cm on left and 6.7 x 5.4 on right. No ascites or LAD identified. 1.7 cm omental nodule. Multiple small liver cysts. Splenomegaly.  10/20/22 TVUS (Office US) Uterus: 9.59 x 7.06 x 4.41 cm (mult fibroids, largest posterior measures 4 x 3 cm) EM: 6.35 mm Left Adnexal mass: 7.5 x 6.8 x 6.2 cm  Right adnexal mass: 5.4 x 4.4 x 3.2 cm with increased vascularity Free fluid visualized in the right adnexa and culdesac  Labs (in [facility])  = 29 OVA1 = 6.6 CEA < 0.6 CA 19-9 < 2  She saw Dr. 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 Stage: III C, FTCA      Patient had C. diff colitis , completed a course of Vanco.( last dose was on 12/22/22)  PMH: ET, HTN,  PSH:CESAR, BSO, Csection SH:, three children. No smoking, no drinking. Retired ASA FH:Dad with brain cancer, 70s, Aunt and cousins. ALL: PCN, hives, Latex Medications: ASA, Agrylin, Amlodipine  She completed six cycles of Carboplatin and Taxol. Last dose 4/2023. She declined Avastin maintenance. Not a candidate for PARPi as no somatic or germline mutation. HRp and due to underlying marrow diseae( ET) hgh risk for myelosuppression and secondary AML/ MDS. Routine blood work in Feb, 2024 showed elevated . Scans were normal. On 3/21/24 PET done showed- Multiple FDG avid tissues in the mesentery, retroperitoneal and iliac fernando stations concerning for malignant process. However, in a patient with myelofibrosis, extramedullary hematopoiesis can take place. Left common iliac node with the most intense activity is accessible for percutaneous biopsy. FDG avid left supraclavicular nodes with calcific densities stable since 8/2023 may represent reactive process.  2. Diffuse sclerotic changes throughout the osseous structures compatible with history of myelofibrosis.  Biopsy of the supraclavicular LN consistent with relapsed serous ovarian cancer. [de-identified] : Carboplatin and Taxol (1/12/23- 4/27/23) six cycles.  No pathogenic mutation on genetic test. HRDp [de-identified] : She had some diarrhea. She took Pepto-Bismol and now diarrhea resolved.  Denies any pain. Appetite is good.

## 2025-06-25 NOTE — REVIEW OF SYSTEMS
[Diarrhea: Grade 0] : Diarrhea: Grade 0 [Negative] : Allergic/Immunologic [FreeTextEntry7] : increased frequency BMs

## 2025-06-25 NOTE — REASON FOR VISIT
[Follow-Up Visit] : a follow-up [Pre-Treatment Visit] : a pre-treatment [FreeTextEntry2] : relapsed ovarian cancer.

## 2025-06-25 NOTE — HISTORY OF PRESENT ILLNESS
[Disease: _____________________] : Disease: [unfilled] [AJCC Stage: ____] : AJCC Stage: [unfilled] [de-identified] : 68 y.o female with newly diagnosed fallopian tube cancer s/p surgery on 11/28/2022 Patient initially presented with pain in the vaginal area She saw gyn, Dr. Denson  and an USG was obtained which showed adnexal mass  She was referred to Dr. White. Ms. Vernon is a 68 years old  referred by Dr. Saldana for bilateral adnexal masses.  CT A/P 11/1/22: Bilateral adnexal masses, solid and irregular appearance, 9.5 x 8.2 cm on left and 6.7 x 5.4 on right. No ascites or LAD identified. 1.7 cm omental nodule. Multiple small liver cysts. Splenomegaly.  10/20/22 TVUS (Office US) Uterus: 9.59 x 7.06 x 4.41 cm (mult fibroids, largest posterior measures 4 x 3 cm) EM: 6.35 mm Left Adnexal mass: 7.5 x 6.8 x 6.2 cm  Right adnexal mass: 5.4 x 4.4 x 3.2 cm with increased vascularity Free fluid visualized in the right adnexa and culdesac  Labs (in [facility])  = 29 OVA1 = 6.6 CEA < 0.6 CA 19-9 < 2  She saw Dr. 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 Stage: III C, FTCA      Patient had C. diff colitis , completed a course of Vanco.( last dose was on 12/22/22)  PMH: ET, HTN,  PSH:CESAR, BSO, Csection SH:, three children. No smoking, no drinking. Retired ASA FH:Dad with brain cancer, 70s, Aunt and cousins. ALL: PCN, hives, Latex Medications: ASA, Agrylin, Amlodipine  She completed six cycles of Carboplatin and Taxol. Last dose 4/2023. She declined Avastin maintenance. Not a candidate for PARPi as no somatic or germline mutation. HRp and due to underlying marrow diseae( ET) hgh risk for myelosuppression and secondary AML/ MDS. Routine blood work in Feb, 2024 showed elevated . Scans were normal. On 3/21/24 PET done showed- Multiple FDG avid tissues in the mesentery, retroperitoneal and iliac fernando stations concerning for malignant process. However, in a patient with myelofibrosis, extramedullary hematopoiesis can take place. Left common iliac node with the most intense activity is accessible for percutaneous biopsy. FDG avid left supraclavicular nodes with calcific densities stable since 8/2023 may represent reactive process.  2. Diffuse sclerotic changes throughout the osseous structures compatible with history of myelofibrosis.  Biopsy of the supraclavicular LN consistent with relapsed serous ovarian cancer. [de-identified] : Carboplatin and Taxol (1/12/23- 4/27/23) six cycles.  No pathogenic mutation on genetic test. HRDp [FreeTextEntry1] : Zirabev maintenance C11 - 6/25/25 [de-identified] : Patient is here for follow up and treatment. She is overall feeling well but continues to have multiple bowel movements that are soft, narrow and after every time she eats or drinks. She says it happens with all types of food and has been persistent for a few months, never had it before. She denies any abdominal pain, bloating, dyspepsia. She continues to have occasional bruising, denies any other bleeding. She denies chest pain, SOB, nausea, vomiting, swelling.

## 2025-07-16 NOTE — ASSESSMENT
Problem: Discharge Planning  Goal: Discharge to home or other facility with appropriate resources  3/4/2025 2339 by Jannet Harrell, RN  Outcome: Progressing  3/4/2025 1512 by Kerry Boyer RN  Outcome: Progressing     Problem: ABCDS Injury Assessment  Goal: Absence of physical injury  3/4/2025 2339 by Jannet Harrell, RN  Outcome: Progressing  3/4/2025 1512 by Kerry Boyer, RN  Outcome: Progressing      [Palliative] : Goals of care discussed with patient: Palliative [Palliative Care Plan] : not applicable at this time

## 2025-07-16 NOTE — HISTORY OF PRESENT ILLNESS
[Disease: _____________________] : Disease: [unfilled] [AJCC Stage: ____] : AJCC Stage: [unfilled] [de-identified] : 68 y.o female with newly diagnosed fallopian tube cancer s/p surgery on 11/28/2022 Patient initially presented with pain in the vaginal area She saw gyn, Dr. Denson  and an USG was obtained which showed adnexal mass  She was referred to Dr. White. Ms. Vernon is a 68 years old  referred by Dr. Saldana for bilateral adnexal masses.  CT A/P 11/1/22: Bilateral adnexal masses, solid and irregular appearance, 9.5 x 8.2 cm on left and 6.7 x 5.4 on right. No ascites or LAD identified. 1.7 cm omental nodule. Multiple small liver cysts. Splenomegaly.  10/20/22 TVUS (Office US) Uterus: 9.59 x 7.06 x 4.41 cm (mult fibroids, largest posterior measures 4 x 3 cm) EM: 6.35 mm Left Adnexal mass: 7.5 x 6.8 x 6.2 cm  Right adnexal mass: 5.4 x 4.4 x 3.2 cm with increased vascularity Free fluid visualized in the right adnexa and culdesac  Labs (in [facility])  = 29 OVA1 = 6.6 CEA < 0.6 CA 19-9 < 2  She saw Dr. 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 Stage: III C, FTCA      Patient had C. diff colitis , completed a course of Vanco.( last dose was on 12/22/22)  PMH: ET, HTN,  PSH:CESAR, BSO, Csection SH:, three children. No smoking, no drinking. Retired ASA FH:Dad with brain cancer, 70s, Aunt and cousins. ALL: PCN, hives, Latex Medications: ASA, Agrylin, Amlodipine  She completed six cycles of Carboplatin and Taxol. Last dose 4/2023. She declined Avastin maintenance. Not a candidate for PARPi as no somatic or germline mutation. HRp and due to underlying marrow diseae( ET) hgh risk for myelosuppression and secondary AML/ MDS. Routine blood work in Feb, 2024 showed elevated . Scans were normal. On 3/21/24 PET done showed- Multiple FDG avid tissues in the mesentery, retroperitoneal and iliac fernando stations concerning for malignant process. However, in a patient with myelofibrosis, extramedullary hematopoiesis can take place. Left common iliac node with the most intense activity is accessible for percutaneous biopsy. FDG avid left supraclavicular nodes with calcific densities stable since 8/2023 may represent reactive process.  2. Diffuse sclerotic changes throughout the osseous structures compatible with history of myelofibrosis.  Biopsy of the supraclavicular LN consistent with relapsed serous ovarian cancer. [de-identified] : Carboplatin and Taxol (1/12/23- 4/27/23) six cycles.  No pathogenic mutation on genetic test. HRDp [FreeTextEntry1] : Zirabev maintenance C12 - 7/16/25 [de-identified] : Patient is here for follow up and treatment.  She has no new complaints. She continues to have changes in stool but stable, seeing Gi next week. She is traveling next month and wants to complete her next scans after vacation, leaving 8/17.  She had an episode of LLE ankle swelling for one day which resolved on its own.  She saw cardiology who did Doppler which was negative and they gave her lasix but she did not take it because swelling was gone. Appetite is good.  She denies chest pain, SOB, abdominal pain, nausea, vomiting, bleeding.

## 2025-07-16 NOTE — REASON FOR VISIT
ICU RN CLOSING NOTE

 

PT REMAINED STABLE DURING SHIFT. NO ACUTE DISTRESS NOTED. ALL NEEDS ATTENDED TO PROMPTLY. 
WEINER CATHETER REMOVED DURING BED BATH AT 530AM. INFORMED PT TO NOTIFY NURSE WHEN NEED TO 
URINATE. PT TEACHING TO URINATE WITHIN 8HRS AND ITS IMPORTANCE. NO SEIZURE ACTIVITY DURING 
SHIFT. ALL SAFETY MEASURES IN PLACE. WILL ENDORSE TO NEXT SHIFT FOR CONTINUITY OF CARE. [Follow-Up Visit] : a follow-up [Pre-Treatment Visit] : a pre-treatment [FreeTextEntry2] : relapsed ovarian cancer.

## 2025-07-16 NOTE — HISTORY OF PRESENT ILLNESS
[Disease: _____________________] : Disease: [unfilled] [AJCC Stage: ____] : AJCC Stage: [unfilled] [de-identified] : 68 y.o female with newly diagnosed fallopian tube cancer s/p surgery on 11/28/2022 Patient initially presented with pain in the vaginal area She saw gyn, Dr. Denson  and an USG was obtained which showed adnexal mass  She was referred to Dr. White. Ms. Vernon is a 68 years old  referred by Dr. Saldana for bilateral adnexal masses.  CT A/P 11/1/22: Bilateral adnexal masses, solid and irregular appearance, 9.5 x 8.2 cm on left and 6.7 x 5.4 on right. No ascites or LAD identified. 1.7 cm omental nodule. Multiple small liver cysts. Splenomegaly.  10/20/22 TVUS (Office US) Uterus: 9.59 x 7.06 x 4.41 cm (mult fibroids, largest posterior measures 4 x 3 cm) EM: 6.35 mm Left Adnexal mass: 7.5 x 6.8 x 6.2 cm  Right adnexal mass: 5.4 x 4.4 x 3.2 cm with increased vascularity Free fluid visualized in the right adnexa and culdesac  Labs (in [facility])  = 29 OVA1 = 6.6 CEA < 0.6 CA 19-9 < 2  She saw Dr. 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 Stage: III C, FTCA      Patient had C. diff colitis , completed a course of Vanco.( last dose was on 12/22/22)  PMH: ET, HTN,  PSH:CESAR, BSO, Csection SH:, three children. No smoking, no drinking. Retired ASA FH:Dad with brain cancer, 70s, Aunt and cousins. ALL: PCN, hives, Latex Medications: ASA, Agrylin, Amlodipine  She completed six cycles of Carboplatin and Taxol. Last dose 4/2023. She declined Avastin maintenance. Not a candidate for PARPi as no somatic or germline mutation. HRp and due to underlying marrow diseae( ET) hgh risk for myelosuppression and secondary AML/ MDS. Routine blood work in Feb, 2024 showed elevated . Scans were normal. On 3/21/24 PET done showed- Multiple FDG avid tissues in the mesentery, retroperitoneal and iliac fernando stations concerning for malignant process. However, in a patient with myelofibrosis, extramedullary hematopoiesis can take place. Left common iliac node with the most intense activity is accessible for percutaneous biopsy. FDG avid left supraclavicular nodes with calcific densities stable since 8/2023 may represent reactive process.  2. Diffuse sclerotic changes throughout the osseous structures compatible with history of myelofibrosis.  Biopsy of the supraclavicular LN consistent with relapsed serous ovarian cancer. [de-identified] : Carboplatin and Taxol (1/12/23- 4/27/23) six cycles.  No pathogenic mutation on genetic test. HRDp [FreeTextEntry1] : Zirabev maintenance C12 - 7/16/25 [de-identified] : Patient is here for follow up and treatment.  She has no new complaints. She continues to have changes in stool but stable, seeing Gi next week. She is traveling next month and wants to complete her next scans after vacation, leaving 8/17.  She had an episode of LLE ankle swelling for one day which resolved on its own.  She saw cardiology who did Doppler which was negative and they gave her lasix but she did not take it because swelling was gone. Appetite is good.  She denies chest pain, SOB, abdominal pain, nausea, vomiting, bleeding.